# Patient Record
Sex: MALE | Employment: FULL TIME | URBAN - METROPOLITAN AREA
[De-identification: names, ages, dates, MRNs, and addresses within clinical notes are randomized per-mention and may not be internally consistent; named-entity substitution may affect disease eponyms.]

---

## 2023-05-08 ENCOUNTER — OFFICE VISIT (OUTPATIENT)
Dept: PRIMARY CARE | Facility: CLINIC | Age: 33
End: 2023-05-08
Payer: COMMERCIAL

## 2023-05-08 VITALS
HEIGHT: 72 IN | WEIGHT: 209.6 LBS | TEMPERATURE: 97.9 F | DIASTOLIC BLOOD PRESSURE: 71 MMHG | HEART RATE: 85 BPM | BODY MASS INDEX: 28.39 KG/M2 | SYSTOLIC BLOOD PRESSURE: 116 MMHG

## 2023-05-08 DIAGNOSIS — E55.9 VITAMIN D DEFICIENCY: Primary | ICD-10-CM

## 2023-05-08 DIAGNOSIS — R73.03 PREDIABETES: ICD-10-CM

## 2023-05-08 DIAGNOSIS — E78.5 DYSLIPIDEMIA: ICD-10-CM

## 2023-05-08 DIAGNOSIS — H53.9 VISION CHANGES: ICD-10-CM

## 2023-05-08 DIAGNOSIS — H61.23 EXCESSIVE CERUMEN IN BOTH EAR CANALS: ICD-10-CM

## 2023-05-08 DIAGNOSIS — R73.09 ELEVATED HEMOGLOBIN A1C: ICD-10-CM

## 2023-05-08 PROCEDURE — 99214 OFFICE O/P EST MOD 30 MIN: CPT | Performed by: FAMILY MEDICINE

## 2023-05-08 PROCEDURE — 1036F TOBACCO NON-USER: CPT | Performed by: FAMILY MEDICINE

## 2023-05-08 NOTE — PROGRESS NOTES
Subjective   Patient ID: Ronald Aiken is a 32 y.o. male who presents for Establish Care.  HPI    The patient is here for:  1- blood test order for diabetes and cholesterol screen.  2- bilateral ear discomfort.  3- Sun discomfort in the eyes.   A review of system was completed.  All systems were reviewed and were normal, except for the ones that are listed in the HPI.    Objective   Physical Exam    Assessment/Plan   Problem List Items Addressed This Visit          Endocrine/Metabolic    Vitamin D deficiency - Primary    Relevant Orders    Hemoglobin A1C    Lipid Panel    Vitamin D 25-Hydroxy,Total    Prediabetes    Relevant Orders    Hemoglobin A1C       Hematologic    Elevated hemoglobin A1c       Other    Dyslipidemia    Relevant Orders    Lipid Panel    Excessive cerumen in both ear canals     Other Visit Diagnoses       Vision changes        Relevant Orders    Referral to Ophthalmology

## 2023-12-03 ENCOUNTER — APPOINTMENT (OUTPATIENT)
Dept: RADIOLOGY | Facility: HOSPITAL | Age: 33
End: 2023-12-03

## 2023-12-03 ENCOUNTER — HOSPITAL ENCOUNTER (EMERGENCY)
Facility: HOSPITAL | Age: 33
Discharge: HOME | End: 2023-12-03
Attending: EMERGENCY MEDICINE

## 2023-12-03 VITALS
OXYGEN SATURATION: 99 % | WEIGHT: 209.66 LBS | DIASTOLIC BLOOD PRESSURE: 79 MMHG | HEIGHT: 77 IN | SYSTOLIC BLOOD PRESSURE: 124 MMHG | TEMPERATURE: 98.8 F | HEART RATE: 72 BPM | RESPIRATION RATE: 16 BRPM | BODY MASS INDEX: 24.76 KG/M2

## 2023-12-03 DIAGNOSIS — U07.1 COVID: Primary | ICD-10-CM

## 2023-12-03 LAB
ALBUMIN SERPL BCP-MCNC: 4.4 G/DL (ref 3.4–5)
ALP SERPL-CCNC: 59 U/L (ref 33–120)
ALT SERPL W P-5'-P-CCNC: 15 U/L (ref 10–52)
ANION GAP SERPL CALC-SCNC: 14 MMOL/L (ref 10–20)
AST SERPL W P-5'-P-CCNC: 19 U/L (ref 9–39)
BASOPHILS # BLD AUTO: 0.03 X10*3/UL (ref 0–0.1)
BASOPHILS NFR BLD AUTO: 0.5 %
BILIRUB SERPL-MCNC: 0.8 MG/DL (ref 0–1.2)
BUN SERPL-MCNC: 12 MG/DL (ref 6–23)
CALCIUM SERPL-MCNC: 8.5 MG/DL (ref 8.6–10.3)
CHLORIDE SERPL-SCNC: 103 MMOL/L (ref 98–107)
CO2 SERPL-SCNC: 24 MMOL/L (ref 21–32)
CREAT SERPL-MCNC: 0.84 MG/DL (ref 0.5–1.3)
EOSINOPHIL # BLD AUTO: 0.12 X10*3/UL (ref 0–0.7)
EOSINOPHIL NFR BLD AUTO: 2.1 %
ERYTHROCYTE [DISTWIDTH] IN BLOOD BY AUTOMATED COUNT: 13.6 % (ref 11.5–14.5)
FLUAV RNA RESP QL NAA+PROBE: NOT DETECTED
FLUBV RNA RESP QL NAA+PROBE: NOT DETECTED
GFR SERPL CREATININE-BSD FRML MDRD: >90 ML/MIN/1.73M*2
GLUCOSE SERPL-MCNC: 87 MG/DL (ref 74–99)
HCT VFR BLD AUTO: 41.3 % (ref 41–52)
HGB BLD-MCNC: 13.8 G/DL (ref 13.5–17.5)
IMM GRANULOCYTES # BLD AUTO: 0.01 X10*3/UL (ref 0–0.7)
IMM GRANULOCYTES NFR BLD AUTO: 0.2 % (ref 0–0.9)
LYMPHOCYTES # BLD AUTO: 2.47 X10*3/UL (ref 1.2–4.8)
LYMPHOCYTES NFR BLD AUTO: 42.7 %
MCH RBC QN AUTO: 27.8 PG (ref 26–34)
MCHC RBC AUTO-ENTMCNC: 33.4 G/DL (ref 32–36)
MCV RBC AUTO: 83 FL (ref 80–100)
MONOCYTES # BLD AUTO: 0.77 X10*3/UL (ref 0.1–1)
MONOCYTES NFR BLD AUTO: 13.3 %
NEUTROPHILS # BLD AUTO: 2.38 X10*3/UL (ref 1.2–7.7)
NEUTROPHILS NFR BLD AUTO: 41.2 %
NRBC BLD-RTO: 0 /100 WBCS (ref 0–0)
PLATELET # BLD AUTO: 199 X10*3/UL (ref 150–450)
POTASSIUM SERPL-SCNC: 3.9 MMOL/L (ref 3.5–5.3)
PROT SERPL-MCNC: 7.5 G/DL (ref 6.4–8.2)
RBC # BLD AUTO: 4.96 X10*6/UL (ref 4.5–5.9)
RSV RNA RESP QL NAA+PROBE: NOT DETECTED
S PYO DNA THROAT QL NAA+PROBE: NOT DETECTED
SARS-COV-2 RNA RESP QL NAA+PROBE: DETECTED
SODIUM SERPL-SCNC: 137 MMOL/L (ref 136–145)
WBC # BLD AUTO: 5.8 X10*3/UL (ref 4.4–11.3)

## 2023-12-03 PROCEDURE — 99283 EMERGENCY DEPT VISIT LOW MDM: CPT | Mod: 25

## 2023-12-03 PROCEDURE — 87637 SARSCOV2&INF A&B&RSV AMP PRB: CPT | Performed by: EMERGENCY MEDICINE

## 2023-12-03 PROCEDURE — 87651 STREP A DNA AMP PROBE: CPT

## 2023-12-03 PROCEDURE — 99284 EMERGENCY DEPT VISIT MOD MDM: CPT | Performed by: EMERGENCY MEDICINE

## 2023-12-03 PROCEDURE — 71046 X-RAY EXAM CHEST 2 VIEWS: CPT

## 2023-12-03 PROCEDURE — 71046 X-RAY EXAM CHEST 2 VIEWS: CPT | Performed by: RADIOLOGY

## 2023-12-03 PROCEDURE — 85025 COMPLETE CBC W/AUTO DIFF WBC: CPT

## 2023-12-03 PROCEDURE — 80053 COMPREHEN METABOLIC PANEL: CPT

## 2023-12-03 RX ORDER — ACETAMINOPHEN 500 MG
1000 TABLET ORAL EVERY 8 HOURS PRN
Qty: 18 TABLET | Refills: 0 | Status: SHIPPED | OUTPATIENT
Start: 2023-12-03 | End: 2023-12-06

## 2023-12-03 RX ORDER — IBUPROFEN 800 MG/1
800 TABLET ORAL EVERY 8 HOURS PRN
Qty: 9 TABLET | Refills: 0 | Status: SHIPPED | OUTPATIENT
Start: 2023-12-03 | End: 2023-12-06

## 2023-12-03 RX ORDER — FLUTICASONE PROPIONATE 50 MCG
1 SPRAY, SUSPENSION (ML) NASAL DAILY
Qty: 16 G | Refills: 0 | Status: SHIPPED | OUTPATIENT
Start: 2023-12-03 | End: 2024-01-02

## 2023-12-03 ASSESSMENT — PAIN DESCRIPTION - FREQUENCY: FREQUENCY: INTERMITTENT

## 2023-12-03 ASSESSMENT — PAIN DESCRIPTION - DESCRIPTORS: DESCRIPTORS: PRESSURE;CRUSHING

## 2023-12-03 ASSESSMENT — PAIN - FUNCTIONAL ASSESSMENT
PAIN_FUNCTIONAL_ASSESSMENT: 0-10
PAIN_FUNCTIONAL_ASSESSMENT: 0-10

## 2023-12-03 ASSESSMENT — COLUMBIA-SUICIDE SEVERITY RATING SCALE - C-SSRS
2. HAVE YOU ACTUALLY HAD ANY THOUGHTS OF KILLING YOURSELF?: NO
6. HAVE YOU EVER DONE ANYTHING, STARTED TO DO ANYTHING, OR PREPARED TO DO ANYTHING TO END YOUR LIFE?: NO
1. IN THE PAST MONTH, HAVE YOU WISHED YOU WERE DEAD OR WISHED YOU COULD GO TO SLEEP AND NOT WAKE UP?: NO

## 2023-12-03 ASSESSMENT — PAIN DESCRIPTION - PAIN TYPE: TYPE: ACUTE PAIN

## 2023-12-03 ASSESSMENT — PAIN SCALES - GENERAL
PAINLEVEL_OUTOF10: 7
PAINLEVEL_OUTOF10: 3

## 2023-12-03 ASSESSMENT — PAIN DESCRIPTION - ONSET: ONSET: ONGOING

## 2023-12-03 ASSESSMENT — PAIN DESCRIPTION - LOCATION: LOCATION: HEAD

## 2023-12-03 NOTE — Clinical Note
Ronald Aiken was seen and treated in our emergency department on 12/3/2023.  He may return to work on 12/07/2023.       If you have any questions or concerns, please don't hesitate to call.      Guille Hernandez PA-C

## 2023-12-04 NOTE — ED PROVIDER NOTES
HPI   Chief Complaint   Patient presents with    Headache    Sore Throat    Facial Pain     Pt states that he has had head pain/ facial pressure/ sore throat/ loss of taste and smell. Pt states that his ear feel full of fluid.        32-year-old male with no significant past medical history presents the ED today with chief concern of flulike symptoms.  Patient states that for the past 3 days he has had anosmia, lightheadedness, sore throat, nasal congestion/pressure, and on and off headache.  He is been taking Mucinex for symptoms at home.  He is up-to-date on all immunizations.  No known exposure to sick contacts.  Denies any body aches.  Denies neck pain or stiffness.  Denies syncopal episodes.  Denies any chest pain or shortness of breath.  Denies exacerbating or relieving factors.  Denies cough.  Denies fever/chills, nausea/vomiting.  No other symptoms or concerns at this time.      History provided by:  Patient   used: No                        No data recorded                Patient History   Past Medical History:   Diagnosis Date    Personal history of other diseases of the nervous system and sense organs 03/25/2016    History of viral conjunctivitis     Past Surgical History:   Procedure Laterality Date    OTHER SURGICAL HISTORY  06/29/2020    Knee surgery     No family history on file.  Social History     Tobacco Use    Smoking status: Never    Smokeless tobacco: Never   Vaping Use    Vaping Use: Never used   Substance Use Topics    Alcohol use: Yes    Drug use: Never       Physical Exam   ED Triage Vitals [12/03/23 1742]   Temp Heart Rate Resp BP   37.1 °C (98.8 °F) 78 16 131/76      SpO2 Temp Source Heart Rate Source Patient Position   96 % Oral Monitor Sitting      BP Location FiO2 (%)     Left arm --       Physical Exam  Gen.: Vitals noted no distress afebrile. Normal phonation, no stridor, no trismus. Patient is handling secretions well without any tripod positioning or  drooling.  ENT: TMs clear bilaterally, EACs unremarkable. Mastoids nontender. Posterior oropharynx without erythema, exudate, or swelling. Uvula is in the midline and nonedematous. No Martin's Angina.   Neck: Supple. No meningismus through full range of motion. No lymphadenopathy.   Cardiac: Regular rate rhythm no murmur.   Lungs: Good aeration throughout. No adventitious breath sounds.   Abdomen: Soft nontender nonsurgical throughout  Extremities: No peripheral edema. extremities are moist with good skin turgor.  Skin: No rash.   Neuro: No focal neurologic deficits. cranial nerves II-XII grossly intact.     ED Course & MDM   ED Course as of 12/03/23 2138   Sun Dec 03, 2023   1924 COVID is positive []   2026     IMPRESSION:  1. No acute cardiopulmonary process.   []   2026 CBC shows no evidence of leukocytosis or anemia.  CMP shows no ALEXIS or acute liver injury.  Group A strep unremarkable.  RSV unremarkable.  Influenza unremarkable. []   2027 Ventricular rate 70 bpm.  IN interval 162 ms.  QRS duration 78 ms.  QT/QTc 382/412 ms.  Patient is in normal sinus rhythm at a ventricular rate of 70 bpm.  Normal axis.  There is good R wave progression.  No right or left bundle branch block.  No ST elevations or T wave inversions.  No previous EKGs to compare this to. []   2138 Patient passed the walking pulse ox test in the ED []      ED Course User Index  [] Guille Hernandez PA-C         Diagnoses as of 12/03/23 2138   COVID       Medical Decision Making  32-year-old male no significant past medical history presents the ED today with a chief concern of flulike symptoms.  Vital signs are reassuring.  Patient overall appears well and is nontoxic-appearing.  He was given Tylenol and ibuprofen in the ED. patient has full range of motion of the neck without any meningismus.  He satting well on room air.  He has no chest pain.  His PERC is 0.  No signs of pneumonia on exam.  Chest x-ray shows no pneumonia.  EKG  unremarkable.  Lab work is unremarkable.  His COVID is positive.  Remainder of viral testing negative.  Group A strep negative.  I do not think antibiotics are indicated at this time.  We will treat symptomatically at this time.  I am not concerned for any PE at this time.  Patient has no pleuritic chest pain.  Discussed my impression and findings with patient and he feels comfortable returning home.  We discussed very strict return precautions including returning for any new or worsening signs or symptoms.  Patient is in agreement this plan.  He will follow-up with the PCP within 3 days.  Again discussed strict return precautions.    Differential diagnosis: COVID, influenza, RSV, group A strep, meningitis, pneumonia, epiglottitis, foreign body, GC/CHL, Martin angina, mononucleosis, PTA, retropharyngeal abscess, strep pharyngitis, viral pharyngitis, dehydration, PE, vertebral artery dissection, ACS    Disposition/treatment  1.  COVID    Shared decision-making was used patient feels comfortable returning home     Patient was advised to follow up with recommended provider in 1 day1 for another evaluation and exam. I advised patient/guardian to return or go to closest emergency room immediately if symptoms change, get worse, new symptoms develop prior to follow up. If there is no improvement in symptoms in the next 24 hours they are advised to return for further evaluation and exam. I also explained the plan and treatment course. Patient/guardian is in agreement with plan, treatment course, and follow up and states verbally that they will comply.    Homegoing. I discussed the differential; results and discharge plan with the patient and/or family/friend/caregiver if present.  I emphasized the importance of follow-up with the physician I referred them to in the timeframe recommended.  I explained reasons for the patient to return to the Emergency Department. They agreed that if they feel their condition is worsening or  if they have any other concern they should call 911 immediately for further assistance. I gave the patient an opportunity to ask all questions they had and answered all of them accordingly. They understand return precautions and discharge instructions. The patient and/or family/friend/caregiver expressed understanding verbally and that they would comply.        This note has been transcribed using voice recognition and may contain grammatical errors, misplaced words, incorrect words, incorrect phrases or other errors.        Procedure  Procedures     Guille Hernandez PA-C  12/03/23 5736

## 2024-09-29 ENCOUNTER — HOSPITAL ENCOUNTER (EMERGENCY)
Facility: HOSPITAL | Age: 34
Discharge: HOME | End: 2024-09-29
Payer: MEDICAID

## 2024-09-29 ENCOUNTER — APPOINTMENT (OUTPATIENT)
Dept: RADIOLOGY | Facility: HOSPITAL | Age: 34
End: 2024-09-29
Payer: MEDICAID

## 2024-09-29 VITALS
OXYGEN SATURATION: 98 % | DIASTOLIC BLOOD PRESSURE: 56 MMHG | HEART RATE: 75 BPM | HEIGHT: 72 IN | WEIGHT: 215 LBS | RESPIRATION RATE: 16 BRPM | BODY MASS INDEX: 29.12 KG/M2 | TEMPERATURE: 98.1 F | SYSTOLIC BLOOD PRESSURE: 124 MMHG

## 2024-09-29 DIAGNOSIS — M54.50 ACUTE LEFT-SIDED LOW BACK PAIN WITHOUT SCIATICA: Primary | ICD-10-CM

## 2024-09-29 PROCEDURE — 99283 EMERGENCY DEPT VISIT LOW MDM: CPT

## 2024-09-29 PROCEDURE — 2500000001 HC RX 250 WO HCPCS SELF ADMINISTERED DRUGS (ALT 637 FOR MEDICARE OP): Performed by: SURGERY

## 2024-09-29 PROCEDURE — 72220 X-RAY EXAM SACRUM TAILBONE: CPT | Performed by: RADIOLOGY

## 2024-09-29 PROCEDURE — 72220 X-RAY EXAM SACRUM TAILBONE: CPT

## 2024-09-29 RX ORDER — IBUPROFEN 400 MG/1
800 TABLET ORAL ONCE
Status: COMPLETED | OUTPATIENT
Start: 2024-09-29 | End: 2024-09-29

## 2024-09-29 RX ORDER — ACETAMINOPHEN 325 MG/1
650 TABLET ORAL ONCE
Status: COMPLETED | OUTPATIENT
Start: 2024-09-29 | End: 2024-09-29

## 2024-09-29 RX ORDER — CYCLOBENZAPRINE HCL 10 MG
10 TABLET ORAL 2 TIMES DAILY PRN
Qty: 20 TABLET | Refills: 0 | Status: SHIPPED | OUTPATIENT
Start: 2024-09-29 | End: 2024-10-09

## 2024-09-29 RX ORDER — IBUPROFEN 800 MG/1
800 TABLET ORAL 3 TIMES DAILY
Qty: 21 TABLET | Refills: 0 | Status: SHIPPED | OUTPATIENT
Start: 2024-09-29 | End: 2024-10-06

## 2024-09-29 ASSESSMENT — COLUMBIA-SUICIDE SEVERITY RATING SCALE - C-SSRS
2. HAVE YOU ACTUALLY HAD ANY THOUGHTS OF KILLING YOURSELF?: NO
1. IN THE PAST MONTH, HAVE YOU WISHED YOU WERE DEAD OR WISHED YOU COULD GO TO SLEEP AND NOT WAKE UP?: NO
6. HAVE YOU EVER DONE ANYTHING, STARTED TO DO ANYTHING, OR PREPARED TO DO ANYTHING TO END YOUR LIFE?: NO

## 2024-09-29 ASSESSMENT — PAIN - FUNCTIONAL ASSESSMENT: PAIN_FUNCTIONAL_ASSESSMENT: 0-10

## 2024-09-29 ASSESSMENT — PAIN SCALES - GENERAL: PAINLEVEL_OUTOF10: 8

## 2024-09-29 ASSESSMENT — PAIN DESCRIPTION - PAIN TYPE: TYPE: ACUTE PAIN

## 2024-09-29 ASSESSMENT — PAIN DESCRIPTION - LOCATION: LOCATION: BACK

## 2024-09-29 ASSESSMENT — PAIN DESCRIPTION - ORIENTATION: ORIENTATION: LOWER

## 2024-09-29 NOTE — ED TRIAGE NOTES
Pt. Arrived to the ED via private car for c/o fall and lower back pain. Per pt. He was walking and slipped on a wet floor around 3:30 landing on his tailbone and causing pain at the tailbone and lower back. Pt. Denies hitting his head.

## 2024-09-30 NOTE — ED PROVIDER NOTES
Chief Complaint   Patient presents with    Back Pain    Fall       HPI:   Ronald Aiken is an 33 y.o. male with no significant past medical history presenting to the ED for chief complaint of low back pain.  He explains he was at [employer] earlier when he did not notice the wet floor sign on the ground and experienced a mechanical fall slipping and falling directly down onto his bottom.  Explains that he fell more on the left leg on the right side.  He is reporting left-sided lower back pain that radiates to his glutes and pain in his sacrum.      No Known Allergies:  Past Medical History:   Diagnosis Date    Personal history of other diseases of the nervous system and sense organs 03/25/2016    History of viral conjunctivitis     Past Surgical History:   Procedure Laterality Date    OTHER SURGICAL HISTORY  06/29/2020    Knee surgery     No family history on file.     Physical Exam  Vitals and nursing note reviewed.   Constitutional:       General: He is not in acute distress.     Appearance: He is well-developed.   HENT:      Head: Normocephalic and atraumatic.      Right Ear: Tympanic membrane normal.      Left Ear: Tympanic membrane normal.      Nose: Nose normal.      Mouth/Throat:      Mouth: Mucous membranes are moist.      Pharynx: Oropharynx is clear.   Eyes:      Extraocular Movements: Extraocular movements intact.      Conjunctiva/sclera: Conjunctivae normal.      Pupils: Pupils are equal, round, and reactive to light.   Cardiovascular:      Rate and Rhythm: Normal rate and regular rhythm.      Heart sounds: No murmur heard.  Pulmonary:      Effort: Pulmonary effort is normal. No respiratory distress.      Breath sounds: Normal breath sounds.   Abdominal:      Palpations: Abdomen is soft.      Tenderness: There is no abdominal tenderness.   Musculoskeletal:         General: No swelling.      Cervical back: Neck supple.      Comments: Full range of motion about the hips without pain.  He is directly tender  over the greater trochanter on the left hip.  Tender over the glued muscles in the left.  Paraspinal muscle tenderness in the left side.  No tenderness in the right side.  No midline tenderness deformity or step-offs.  Tender directly over the sacrum there is no bruising or swelling in this area.   Skin:     General: Skin is warm and dry.      Capillary Refill: Capillary refill takes less than 2 seconds.   Neurological:      Mental Status: He is alert.   Psychiatric:         Mood and Affect: Mood normal.        VS: As documented in the triage note and EMR flowsheet from this visit were reviewed.    Medical Decision Making: This is a 33-year-old male presenting to the ED for chief complaint of a fall.  He had experienced a mechanical fall when he slipped on a wet floor and fell directly onto his left glutes.  He reports increased pain in his glutes and sacrum.  He is fully ambulatory and sitting in the chair in no acute distress.  On exam there is no obvious bruising swelling or deformity in the area.  He is directly tender over the paraspinal muscles in the left lower back.  He is also tender over the abductors and the glue and the greater trochanter of the left hip.  The hip moves smoothly and freely.  He has equal leg lengths which are neurovascularly intact.  No saddle anesthesia.  Low suspicion for cord compression or fracture.  He had no midline tenderness.  He was directly tender over the sacrum.  X-rays were obtained and showed no evidence of acute pathology.  Likely musculoskeletal in nature.  Patient was treated with Motrin and Tylenol and states that he did feel relief of his pain.  He is medications were sent to his pharmacy.  I did prescribe him Flexeril for outpatient management.  He understands strict return precautions.  Differential diagnosis includes abrasion, strain, fracture, dislocation, cauda equina,  Diagnoses as of 09/29/24 2011   Acute left-sided low back pain without sciatica     Counseling:  Spoke with the patient and discussed today´s findings, in addition to providing specific details for the plan of care and expected course.  Patient was given the opportunity to ask questions.    Discussed return precautions and importance of follow-up.  Advised to follow-up with PCP.  I specifically advised to return to the ED for changing or worsening symptoms, new symptoms, complaint specific precautions, and precautions listed on the discharge paperwork.  Educated on the common potential side effects of medications prescribed.    I advised the patient that the emergency evaluation and treatment provided today doesn't end their need for medical care. It is very important that they follow-up with their primary care provider or other specialist as instructed.    The plan of care was mutually agreed upon with the patient. The patient and/or family were given the opportunity to ask questions. All questions asked today in the ED were answered to the best of my ability with today's information.    This patient was cared for in the setting of nationwide stress on resources and staffing.    This report was transcribed using voice recognition software.  Every effort was made to ensure accuracy, however, inadvertently computerized transcription errors may be present.       Trent Salguero PA-C  09/30/24 0122     Area M Indication Text: Tumors in this location are included in Area M (cheek, forehead, scalp, neck, jawline and pretibial skin).  Mohs surgery is indicated for tumors in these anatomic locations.

## 2024-10-02 ENCOUNTER — TELEPHONE (OUTPATIENT)
Dept: PRIMARY CARE | Facility: CLINIC | Age: 34
End: 2024-10-02
Payer: MEDICAID

## 2025-01-29 ENCOUNTER — APPOINTMENT (OUTPATIENT)
Dept: PRIMARY CARE | Facility: CLINIC | Age: 35
End: 2025-01-29
Payer: COMMERCIAL

## 2025-01-29 VITALS
SYSTOLIC BLOOD PRESSURE: 130 MMHG | BODY MASS INDEX: 29.97 KG/M2 | HEART RATE: 71 BPM | WEIGHT: 221 LBS | DIASTOLIC BLOOD PRESSURE: 82 MMHG | TEMPERATURE: 98.5 F

## 2025-01-29 DIAGNOSIS — Z11.59 ENCOUNTER FOR HEPATITIS C SCREENING TEST FOR LOW RISK PATIENT: ICD-10-CM

## 2025-01-29 DIAGNOSIS — Z11.3 SCREEN FOR STD (SEXUALLY TRANSMITTED DISEASE): ICD-10-CM

## 2025-01-29 DIAGNOSIS — Z11.4 ENCOUNTER FOR SCREENING FOR HIV: ICD-10-CM

## 2025-01-29 DIAGNOSIS — Z13.220 SCREENING CHOLESTEROL LEVEL: ICD-10-CM

## 2025-01-29 DIAGNOSIS — Z13.1 DIABETES MELLITUS SCREENING: ICD-10-CM

## 2025-01-29 DIAGNOSIS — Z00.00 HEALTH CARE MAINTENANCE: Primary | ICD-10-CM

## 2025-01-29 DIAGNOSIS — R73.09 ELEVATED HEMOGLOBIN A1C: ICD-10-CM

## 2025-01-29 PROCEDURE — 99395 PREV VISIT EST AGE 18-39: CPT | Performed by: FAMILY MEDICINE

## 2025-01-29 PROCEDURE — 1036F TOBACCO NON-USER: CPT | Performed by: FAMILY MEDICINE

## 2025-01-29 NOTE — ASSESSMENT & PLAN NOTE
-Blood test ordered.  -No family history of prostate or colon cancer.  -Immunizations all declined.  -Safe sex recommended.

## 2025-01-29 NOTE — PROGRESS NOTES
Subjective   Patient ID: Ronald Aiken is a 34 y.o. male who presents for Annual Exam.  HPI  The patient is a 33 yo Male with a history of prediabetes, here for:    1- CPE  and blood test.  No change in his health since his last visit.   -Blood test ordered.  -No family history of prostate or colon cancer.  -Immunizations all declined.  -Safe sex recommended.      A review of system was completed.  All systems were reviewed and were normal, except for the ones that are listed in the HPI.    Objective   Physical Exam  Constitutional:       Appearance: Normal appearance.   HENT:      Head: Normocephalic and atraumatic.      Right Ear: Tympanic membrane, ear canal and external ear normal.      Left Ear: Tympanic membrane, ear canal and external ear normal.      Nose: Nose normal.      Mouth/Throat:      Mouth: Mucous membranes are moist.      Pharynx: Oropharynx is clear.   Eyes:      Extraocular Movements: Extraocular movements intact.      Conjunctiva/sclera: Conjunctivae normal.      Pupils: Pupils are equal, round, and reactive to light.   Cardiovascular:      Rate and Rhythm: Normal rate and regular rhythm.      Pulses: Normal pulses.   Pulmonary:      Effort: Pulmonary effort is normal.      Breath sounds: Normal breath sounds.   Abdominal:      General: Abdomen is flat. Bowel sounds are normal.      Palpations: Abdomen is soft.   Musculoskeletal:         General: Normal range of motion.      Cervical back: Normal range of motion and neck supple.   Skin:     General: Skin is warm.   Neurological:      General: No focal deficit present.      Mental Status: He is alert and oriented to person, place, and time. Mental status is at baseline.   Psychiatric:         Mood and Affect: Mood normal.         Behavior: Behavior normal.         Thought Content: Thought content normal.         Judgment: Judgment normal.         Assessment/Plan   Problem List Items Addressed This Visit       Elevated hemoglobin A1c    Relevant  Orders    CBC    Comprehensive Metabolic Panel    Hemoglobin A1C    Lipid Panel    TSH with reflex to Free T4 if abnormal    Health care maintenance - Primary     -Blood test ordered.  -No family history of prostate or colon cancer.  -Immunizations all declined.  -Safe sex recommended.           Relevant Orders    CBC    Comprehensive Metabolic Panel    Hemoglobin A1C    Lipid Panel    TSH with reflex to Free T4 if abnormal    C. trachomatis / N. gonorrhoeae, Amplified, Urogenital    Hepatitis C Antibody    HIV 1/2 Antigen/Antibody Screen with Reflex to Confirmation    Hepatitis B Surface Antigen    HSV1 IgG and HSV2 IgG    Syphilis Screen with Reflex    Trichomonas vaginalis, Amplified    Screening cholesterol level    Relevant Orders    Lipid Panel    Diabetes mellitus screening    Relevant Orders    Hemoglobin A1C    Encounter for hepatitis C screening test for low risk patient    Relevant Orders    Hepatitis C Antibody    Encounter for screening for HIV    Relevant Orders    HIV 1/2 Antigen/Antibody Screen with Reflex to Confirmation    Screen for STD (sexually transmitted disease)    Relevant Orders    C. trachomatis / N. gonorrhoeae, Amplified, Urogenital    Hepatitis C Antibody    HIV 1/2 Antigen/Antibody Screen with Reflex to Confirmation    Hepatitis B Surface Antigen    HSV1 IgG and HSV2 IgG    Syphilis Screen with Reflex    Trichomonas vaginalis, Amplified    Patient to return to office in 12 months for the next CPE.

## 2025-02-01 LAB
ALBUMIN SERPL-MCNC: 4.6 G/DL (ref 3.6–5.1)
ALP SERPL-CCNC: 59 U/L (ref 36–130)
ALT SERPL-CCNC: 20 U/L (ref 9–46)
ANION GAP SERPL CALCULATED.4IONS-SCNC: 8 MMOL/L (CALC) (ref 7–17)
AST SERPL-CCNC: 23 U/L (ref 10–40)
BILIRUB SERPL-MCNC: 1 MG/DL (ref 0.2–1.2)
BUN SERPL-MCNC: 8 MG/DL (ref 7–25)
C TRACH RRNA SPEC QL NAA+PROBE: NOT DETECTED
CALCIUM SERPL-MCNC: 9.4 MG/DL (ref 8.6–10.3)
CHLORIDE SERPL-SCNC: 103 MMOL/L (ref 98–110)
CHOLEST SERPL-MCNC: 200 MG/DL
CHOLEST/HDLC SERPL: 4.5 (CALC)
CO2 SERPL-SCNC: 28 MMOL/L (ref 20–32)
CREAT SERPL-MCNC: 0.93 MG/DL (ref 0.6–1.26)
EGFRCR SERPLBLD CKD-EPI 2021: 109 ML/MIN/1.73M2
ERYTHROCYTE [DISTWIDTH] IN BLOOD BY AUTOMATED COUNT: 13.6 % (ref 11–15)
EST. AVERAGE GLUCOSE BLD GHB EST-MCNC: 134 MG/DL
EST. AVERAGE GLUCOSE BLD GHB EST-SCNC: 7.4 MMOL/L
GLUCOSE SERPL-MCNC: 98 MG/DL (ref 65–99)
HBA1C MFR BLD: 6.3 % OF TOTAL HGB
HBV SURFACE AG SERPL QL IA: NORMAL
HCT VFR BLD AUTO: 44.4 % (ref 38.5–50)
HCV AB SERPL QL IA: NORMAL
HDLC SERPL-MCNC: 44 MG/DL
HGB BLD-MCNC: 14.2 G/DL (ref 13.2–17.1)
HIV 1+2 AB+HIV1 P24 AG SERPL QL IA: NORMAL
HSV1 IGG SER IA-ACNC: <0.9 INDEX
HSV2 IGG SER IA-ACNC: 4.73 INDEX
LDLC SERPL CALC-MCNC: 133 MG/DL (CALC)
MCH RBC QN AUTO: 28.1 PG (ref 27–33)
MCHC RBC AUTO-ENTMCNC: 32 G/DL (ref 32–36)
MCV RBC AUTO: 87.7 FL (ref 80–100)
N GONORRHOEA RRNA SPEC QL NAA+PROBE: NOT DETECTED
NONHDLC SERPL-MCNC: 156 MG/DL (CALC)
PLATELET # BLD AUTO: 247 THOUSAND/UL (ref 140–400)
PMV BLD REES-ECKER: 10.3 FL (ref 7.5–12.5)
POTASSIUM SERPL-SCNC: 4.1 MMOL/L (ref 3.5–5.3)
PROT SERPL-MCNC: 7.2 G/DL (ref 6.1–8.1)
QUEST GC CT AMPLIFIED (ALWAYS MESSAGE): NORMAL
RBC # BLD AUTO: 5.06 MILLION/UL (ref 4.2–5.8)
SODIUM SERPL-SCNC: 139 MMOL/L (ref 135–146)
T PALLIDUM AB SER QL IA: NEGATIVE
T VAGINALIS RRNA SPEC QL NAA+PROBE: NOT DETECTED
TRIGL SERPL-MCNC: 118 MG/DL
TSH SERPL-ACNC: 1.85 MIU/L (ref 0.4–4.5)
WBC # BLD AUTO: 5.1 THOUSAND/UL (ref 3.8–10.8)

## 2025-02-10 ENCOUNTER — TELEPHONE (OUTPATIENT)
Dept: PRIMARY CARE | Facility: CLINIC | Age: 35
End: 2025-02-10
Payer: COMMERCIAL